# Patient Record
Sex: FEMALE | Race: WHITE | Employment: FULL TIME | URBAN - METROPOLITAN AREA
[De-identification: names, ages, dates, MRNs, and addresses within clinical notes are randomized per-mention and may not be internally consistent; named-entity substitution may affect disease eponyms.]

---

## 2019-05-21 ENCOUNTER — OFFICE VISIT (OUTPATIENT)
Dept: INTERNAL MEDICINE CLINIC | Facility: CLINIC | Age: 49
End: 2019-05-21
Payer: COMMERCIAL

## 2019-05-21 ENCOUNTER — APPOINTMENT (OUTPATIENT)
Dept: LAB | Age: 49
End: 2019-05-21
Attending: INTERNAL MEDICINE
Payer: COMMERCIAL

## 2019-05-21 VITALS
SYSTOLIC BLOOD PRESSURE: 138 MMHG | WEIGHT: 207 LBS | HEIGHT: 70 IN | OXYGEN SATURATION: 99 % | DIASTOLIC BLOOD PRESSURE: 90 MMHG | HEART RATE: 78 BPM | BODY MASS INDEX: 29.63 KG/M2

## 2019-05-21 DIAGNOSIS — Z01.84 IMMUNITY TO MEASLES DETERMINED BY SEROLOGIC TEST: ICD-10-CM

## 2019-05-21 DIAGNOSIS — R00.2 PALPITATIONS: ICD-10-CM

## 2019-05-21 DIAGNOSIS — K76.89 HEPATIC CYST: Primary | ICD-10-CM

## 2019-05-21 PROCEDURE — 99204 OFFICE O/P NEW MOD 45 MIN: CPT | Performed by: INTERNAL MEDICINE

## 2019-05-21 PROCEDURE — 99212 OFFICE O/P EST SF 10 MIN: CPT | Performed by: INTERNAL MEDICINE

## 2019-05-21 PROCEDURE — 86765 RUBEOLA ANTIBODY: CPT

## 2019-05-21 PROCEDURE — 36415 COLL VENOUS BLD VENIPUNCTURE: CPT

## 2019-05-21 NOTE — PROGRESS NOTES
Katie Carter is a 44year old female. HPI:   She is here today as a new patient. She is here for evaluation of a hepatic cyst which was seen on 2 D ehco of heart - this was done by Dr Shayy Byrant on 5/15/18.   She presented to Dr Shayy Bryant for evaluation of pa headaches    EXAM:   /90 (BP Location: Right arm, Patient Position: Sitting, Cuff Size: large)   Pulse 78   Ht 5' 10\" (1.778 m)   Wt 207 lb (93.9 kg)   LMP 04/10/2019 (Exact Date)   SpO2 99%   BMI 29.70 kg/m²   GENERAL: well developed, well nourishe

## 2019-05-22 PROBLEM — K76.89 HEPATIC CYST: Status: ACTIVE | Noted: 2019-05-22

## 2019-05-22 PROBLEM — M54.12 CERVICAL RADICULOPATHY: Status: ACTIVE | Noted: 2019-05-22

## 2019-05-22 PROBLEM — R00.2 PALPITATIONS: Status: ACTIVE | Noted: 2019-05-22

## 2019-05-27 ENCOUNTER — TELEPHONE (OUTPATIENT)
Dept: INTERNAL MEDICINE CLINIC | Facility: CLINIC | Age: 49
End: 2019-05-27

## 2019-05-27 NOTE — TELEPHONE ENCOUNTER
Tell her testing shows she has little or no immunity to measles. She should have MMR - 2 doses > 28 days apart. (There are no single antigen vaccines to just measles in the [place], she needs to getr MMR.    Immunization is especially important considering her

## 2019-06-08 ENCOUNTER — HOSPITAL ENCOUNTER (OUTPATIENT)
Dept: ULTRASOUND IMAGING | Facility: HOSPITAL | Age: 49
Discharge: HOME | End: 2019-06-08
Attending: INTERNAL MEDICINE
Payer: COMMERCIAL

## 2019-06-08 DIAGNOSIS — K76.89 HEPATIC CYST: ICD-10-CM

## 2019-06-08 PROCEDURE — 76705 ECHO EXAM OF ABDOMEN: CPT | Performed by: INTERNAL MEDICINE

## 2019-06-14 ENCOUNTER — TELEPHONE (OUTPATIENT)
Dept: INTERNAL MEDICINE CLINIC | Facility: CLINIC | Age: 49
End: 2019-06-14

## 2019-06-14 NOTE — TELEPHONE ENCOUNTER
I did leave message on her VM that she has a simple appearing hepatic cyst and no further action needed.

## 2019-06-26 ENCOUNTER — TELEPHONE (OUTPATIENT)
Dept: INTERNAL MEDICINE CLINIC | Facility: CLINIC | Age: 49
End: 2019-06-26

## 2019-06-26 RX ORDER — LORAZEPAM 0.5 MG/1
TABLET ORAL
Qty: 10 TABLET | Refills: 1 | Status: SHIPPED
Start: 2019-06-26

## 2019-06-26 NOTE — TELEPHONE ENCOUNTER
Pt is scheduled to have an MRI on her neck Tuesday/July 2    Asks for prescription from Dr Lj Hdz to relax her for the MRI   - states she will not be driving -  will be taking her      Uses [facility] [address]    Please call pt today to advise [phone]

## 2019-06-26 NOTE — TELEPHONE ENCOUNTER
Prescription signed by Dr. Yury Hudson and faxed to [place] at [phone]. Fax confirmation received and sent to [employer].

## 2021-01-16 LAB — AMB EXT COVID-19 RESULT: DETECTED

## 2021-01-20 ENCOUNTER — TELEPHONE (OUTPATIENT)
Dept: INTERNAL MEDICINE CLINIC | Facility: CLINIC | Age: 51
End: 2021-01-20

## 2021-01-26 ENCOUNTER — TELEPHONE (OUTPATIENT)
Dept: INTERNAL MEDICINE CLINIC | Facility: CLINIC | Age: 51
End: 2021-01-26

## 2021-01-26 NOTE — TELEPHONE ENCOUNTER
Relayed CDC information below, advised pt we will call if Dr. Aurora Aguilar has any other information in regards to COVID-19 infection and vaccination. Pt states she will get COVID vaccine as soon as she has access.  Pt will wait between 1-3 weeks per CDC recommend

## 2021-01-26 NOTE — TELEPHONE ENCOUNTER
LMTCB--  Per CDC. gov updated 1/21/21: \"While there is no recommended minimum interval between infection and vaccination, current evidence suggests that the risk of SARS-CoV-2 reinfection is low in the months after initial infection but may increase with t

## 2022-04-29 NOTE — TELEPHONE ENCOUNTER
And fill or Aleve 1 or 2 3 times per day, heating pad to medium heat several hours per day. If she is not quite a bit better by the end of the week may be she should come in and let me check her.
Called patient and relayed DR. IRWIN message - verbalized understanding
Patient is calling because she tested positive for Covid Saturday, 1/16/2021. She is calling with back pain and not sure what she can do for this issue. Patient has been taking Tylenol and it doesn't seem to be helping.       # [phone]
Please advise - called patient who tested positive for covid 1/16 , feeling ok just lost sense of smell and taste. Has back pain right above sacrum since yesterday . It  Is dull and worse with sitting, She works from home and is sitting a lot.  Tylenol not
oral